# Patient Record
Sex: MALE | Race: WHITE | Employment: FULL TIME | URBAN - METROPOLITAN AREA
[De-identification: names, ages, dates, MRNs, and addresses within clinical notes are randomized per-mention and may not be internally consistent; named-entity substitution may affect disease eponyms.]

---

## 2017-07-07 ENCOUNTER — HOSPITAL ENCOUNTER (OUTPATIENT)
Dept: PHYSICAL THERAPY | Age: 57
Discharge: HOME | End: 2017-07-07
Payer: OTHER MISCELLANEOUS

## 2017-07-07 PROCEDURE — 97140 MANUAL THERAPY 1/> REGIONS: CPT

## 2017-07-07 PROCEDURE — 97165 OT EVAL LOW COMPLEX 30 MIN: CPT

## 2017-07-07 NOTE — PROGRESS NOTES
Hand Therapy Evaluation and Daily Note    Patient Name: Milton Perea  Date:2017  : 1960  Age: 61 y.o.y/o  [x]  Patient  Verified  Payor: [facility] / Plan: [id number] [facility] / Product Type: Workers Comp /    Referring Provider: MD Mariana Bradshaw MD Visit:  17  Onset Date:  17    In time:1310  Out time:1410  Total Treatment Time (min): 15 min  Visit #: 1 of 8    Treatment Area: Right hand pain [M79.641]    Precautions: NA    Hand Dominance: right handed   Hand Involved: right    Total Evaluation Time:  45 min    History of Present Condition:  Patient is a 61 y.o. male with a chief complaint of pain and limited functional ROM of the right hand/digits for the last month secondary to falling on his hand after he tripped over a piece of luggage at work on 17. He has a history of trigger finger in the right ring finger. Pain appears to be increased at the MF at the radial and dorsal side of the MP. ? Collateral ligament or sagittal band injury. Pain Rating:   Current: (0-no pain 10-debilitating pain) 1 / 10   At best: (0-no pain 10-debilitating pain) 1 / 10  At worst: (0-no pain 10-debilitating pain) 3 / 10  Location: right hand  Type:  mild   Better with: compression garment  Worse with: gripping and extension of the digits at the MP joints. Medications/Allergies/Past Medical History:  See chart; reviewed with patient. Arthritis, HTN, sleep apnea    Diagnostic Tests: x-ray- no fractures noted    Prior Level of Function: Independent without limitations in ADL and IADL activities.     Current Level of Function:  Limited functional use of the right hand secondary to injury    Social History:     Occupation/Job Requirements: - walking, moving, handling luggage and repetitive work, pushing, pulling    Observation: Presents with compression glove on the right hand, noted limited AROM of the digits for extension. Palpation:  Pain at the radial side of the R middle finger MP and on the top of the joint. Range of Motion:   Hand ROM   2nd 3rd 4th 5th   MP WNL 5/80 75 70   PIP  25/90 25/90 15/95   DIP  20/60 5/40 55   SWAN  170 170 205       Strength:   Measurements: Taken with Carrillo Dynamometer, in Lbs   Level 2 7/7/17 Date Date Date Date Date Date   Right 41         Left 107         Deficit 62%         Change                Pinch Measurements: Taken with Pinch Gauge, in Lbs   (hand) 7/7/17 Date Date Date Date Date   Lateral          Right 15        Left  22        Deficit         Change         Pad         Right 12        Left 12        Deficit         Change         Horacio         Right 8        Left 14        Deficit         Change           Sensation:    No problems reported    Edema: GIRTH CHART measured in cm  Date: 7/7/17    Side right left   DPC circum. 24 23.5   2nd- P1 7.5 7   3rd-P1 7.6 7              Special Tests:    Provocative test: Positive pain with lateral stress to the right MF ulnarly.     ADLs  Feeding:        []MaxA   []ModA   []Cherise   [] CGA   []SBA   []Isai   [x]Independent  UE Dressing:       []MaxA   []ModA   []Cherise   [] CGA   []SBA   []Isai   [x]Independent  LE Dressing:       []MaxA   []ModA   []Cherise   [] CGA   []SBA   []Isai   [x]Independent  Grooming:       []MaxA   []ModA   []Cherise   [] CGA   []SBA   []Isai   [x]Independent  Toileting:       []MaxA   []ModA   []Cherise   [] CGA   []SBA   []Isai   [x]Independent  Bathing:       []MaxA   []ModA   []Cherise   [] CGA   []SBA   []Isai   [x]Independent  Light Meal Prep:    []MaxA   []ModA   []Cherise   [] CGA   []SBA   []Isai   [x]Independent  Household/Other: []MaxA   []ModA   []Cherise   [] CGA   []SBA   []Isai   [x]Independent  Adaptive Equip:     []MaxA   []ModA   []Cherise   [] CGA   []SBA   []Isai   []Independent  Driving:       []MaxA   []ModA   []Cherise   [] CGA   []SBA   []Isai   [x]Independent      Todays Treatment:  Patient received an initial evaluation today followed by education as to diagnosis, precautions and treatment plan. Patient was provided with a basic home exercise program including instruction in jacky strap donning/doffing, wear and care, activity modifications and tendon gliding. OBJECTIVE    5 min Therapeutic Exercise:  [x] See flow sheet : ROM   Rationale: increase ROM to improve the patients ability to reduce edema    5 min Manual Therapy:  IASTM with Graston tool # 3, 6, using sweeping and fanning techniques   Rationale: decrease pain, increase ROM, increase tissue extensibility and decrease edema  to  Right hand/MF    5 min Orthotic/Splinting: fitted with jacky strap MF to IF   Rationale: lateral stress protection  to improve the patients ability to heal soft tissue injury while maintaining functional use. With   [] TE   [] TA   [] neuro   [] other: Patient Education: [x] Review HEP    [] Progressed/Changed HEP based on:   [] positioning   [] body mechanics   [] transfers   [] heat/ice application   [] Splint wear/care   [] Sensory re-education   [] scar management      [] other:      Pain Level (0-10 scale) post treatment: 1/10    Patient will continue to benefit from skilled OT services to address ROM deficits, address strength deficits and analyze and address soft tissue restrictions to attain goals. Assessment:  Patient is a 61 y.o. male with a chief complaint of pain and limited functional ROM of the right hand/digits for the last month secondary to falling on his hand after he tripped over a piece of luggage at work on 6/14/17. He has a history of trigger finger in the right ring finger. Pain appears to be increased at the MF at the radial and dorsal side of the MP. ? Collateral ligament or sagittal band injury. x-ray- no fractures noted  Presents with compression glove on the right hand, noted limited AROM of the digits for extension.   Patient received an initial evaluation today followed by education as to diagnosis, precautions and treatment plan. Patient was provided with a basic home exercise program including instruction in jacky strap donning/doffing, wear and care, activity modifications and tendon gliding.       Evaluation Complexity: History LOW Complexity : Brief history review  Examination LOW Complexity : 1-3 performance deficits relating to physical, cognitive , or psychosocial skils that result in activity limitations and / or participation restrictions  Clinical Decision Making LOW Complexity : No comorbidities that affect functional and no verbal or physical assistance needed to complete eval tasks   Overall Complexity Rating: LOW      Problem List: Pain effecting function, Decreased range of motion, Decreased strength, Edema effecting function and Decreased coordination/prehension                         Treatment Plan may include any combination of the following: Therapeutic exercise, Physical agent/modality, Manual therapy, Splinting/orthoses and Patient education     Patient / Family readiness to learn indicated by: asking questions, trying to perform skills and interest     Persons(s) to be included in education:   patient (P)     Barriers to Learning/Limitations: None     Patient Goal (s): reduce pain and return to full use of the hand     Patient Self Reported Health Status: excellent     Rehabilitation Potential: excellent       Short Term Goals: To be accomplished in 2  weeks:  Goal:* Patient will be compliant with initial home exercise program to take an active role in their rehabilitation process. Status at Eval:    Goal:* Patient will demonstrate a good understanding of their condition and strategies for self-management. Status at Eval:    Long Term Goals: To be accomplished in 4 weeks:                       Goal:*Patient will regain 220 degrees total arc of motion of the right digits 2-5 to enable grasp of cylindrical objects such as a glass, handle or toothbrush.   At Eval:  See chart    Goal:*Patient will attain 10 degrees or less of right 2-5 digital extension to enable him/ her to reach into pocket. At Eval: see eval    Goal:* Patient will show a 5 point improvement on FOTO functional status measure to improve overall functional performance. Status at Eval: 69    Goal:* Pt will have 100 pounds of  in the right hand to allow for functional grasp for all ADL activities including dressing, bathing and self care.   At Eval: 41       Frequency / Duration: Patient to be seen 2 times per week for 4 weeks:     Patient/ Caregiver education and instruction: Diagnosis, prognosis, self care, activity modification, brace/ splint application and exercises  [x]  Plan of care has been reviewed with Michael Cardozo OT 7/7/2017 2:30 PM

## 2017-07-09 NOTE — PROGRESS NOTES
[facility]  [address]  [place], [address]  [phone] fax    Plan of Care/Statement of Necessity for Occupational Therapy Services    Patient name: Anival Krishna Start of Care: 2017   Referral source: Jeryr Garay MD : 1960    Medical Diagnosis: Right hand pain [M79.641]   Onset Date: 17   Treatment Diagnosis: right hand pain   Prior Hospitalization: see medical history Provider#: [id number]   Medications: Verified on Patient summary List    Comorbidities: HTN, sleep apnea   Prior Level of Function: Independent without limitations in ADL and IADL activities. The Plan of Care and following information is based on the information from the initial evaluation. Assessment/ key information: Patient is a 61 y.o. male with a chief complaint of pain and limited functional ROM of the right hand/digits for the last month secondary to falling on his hand after he tripped over a piece of luggage at work on 17. He has a history of trigger finger in the right ring finger. Pain appears to be increased at the MF at the radial and dorsal side of the MP. ? Collateral ligament or sagittal band injury. x-ray- no fractures noted  Presents with compression glove on the right hand, noted limited AROM of the digits for extension. Patient received an initial evaluation today followed by education as to diagnosis, precautions and treatment plan.   Patient was provided with a basic home exercise program including instruction in jacky strap donning/doffing, wear and care, activity modifications and tendon gliding.       Evaluation Complexity: History LOW Complexity : Brief history review  Examination LOW Complexity : 1-3 performance deficits relating to physical, cognitive , or psychosocial skils that result in activity limitations and / or participation restrictions  Clinical Decision Making LOW Complexity : No comorbidities that affect functional and no verbal or physical assistance needed to complete eval tasks   Overall Complexity Rating: LOW       Problem List: Pain effecting function, Decreased range of motion, Decreased strength, Edema effecting function and Decreased coordination/prehension                          Treatment Plan may include any combination of the following: Therapeutic exercise, Physical agent/modality, Manual therapy, Splinting/orthoses and Patient education      Patient / Family readiness to learn indicated by: asking questions, trying to perform skills and interest      Persons(s) to be included in education:   patient (P)      Barriers to Learning/Limitations: None      Patient Goal (s): reduce pain and return to full use of the hand      Patient Self Reported Health Status: excellent      Rehabilitation Potential: excellent         Short Term Goals: To be accomplished in 2  weeks:  Goal:* Patient will be compliant with initial home exercise program to take an active role in their rehabilitation process. Status at Eval:     Goal:* Patient will demonstrate a good understanding of their condition and strategies for self-management. Status at Eval:     Long Term Goals: To be accomplished in 4 weeks:                       Goal:*Patient will regain 220 degrees total arc of motion of the right digits 2-5 to enable grasp of cylindrical objects such as a glass, handle or toothbrush. At Eval:  See chart     Goal:*Patient will attain 10 degrees or less of right 2-5 digital extension to enable him/ her to reach into pocket. At Eval: see eval     Goal:* Patient will show a 5 point improvement on FOTO functional status measure to improve overall functional performance. Status at Eval: 69     Goal:* Pt will have 100 pounds of  in the right hand to allow for functional grasp for all ADL activities including dressing, bathing and self care.   At Eval: 39         Frequency / Duration: Patient to be seen 2 times per week for 4 weeks:      Patient/ Caregiver education and instruction: Diagnosis, prognosis, self care, activity modification, brace/ splint application and exercises  [x]  Plan of care has been reviewed with CHERISE Yan OT 7/7/2017 2:30 PM________________________________________________________________________    I certify that the above Therapy Services are being furnished while the patient is under my care. I agree with the treatment plan and certify that this therapy is necessary.     Physician's Signature:____________________  Date:____________Time:__________    Please sign and return to [facility] [address]  [address]  [place], [address]  [phone] fax

## 2017-07-12 ENCOUNTER — HOSPITAL ENCOUNTER (OUTPATIENT)
Dept: PHYSICAL THERAPY | Age: 57
Discharge: HOME | End: 2017-07-12
Payer: OTHER MISCELLANEOUS

## 2017-07-12 PROCEDURE — 97022 WHIRLPOOL THERAPY: CPT

## 2017-07-12 PROCEDURE — 97035 APP MDLTY 1+ULTRASOUND EA 15: CPT

## 2017-07-12 PROCEDURE — 97140 MANUAL THERAPY 1/> REGIONS: CPT

## 2017-07-12 NOTE — PROGRESS NOTES
OT DAILY TREATMENT NOTE  3-16    Patient Name: Bandar Fuentes  Date:2017  : 1960  [x]  Patient  Verified  Payor: Johnyn Randolph / Plan: [id number] / Product Type: Workers Comp /    In [employer] time:320  Total Treatment Time (min): 50  Visit #: 2 of 8    Treatment Area: Right hand pain [M79.641]    SUBJECTIVE  Pain Level (0-10 scale): 0/10  Any medication changes, allergies to medications, adverse drug reactions, diagnosis change, or new procedure performed?: [x] No    [] Yes (see summary sheet for update)  Subjective functional status/changes:   [] No changes reported  I haven't really done my exercises. I have been busy and I just forget. There is no pain if I am not doing anything, if I move it, it is sore and stiff.     OBJECTIVE    Modality rationale: decrease pain and increase tissue extensibility to improve the patients ability to use R hand   Min Type Additional Details    [] Estim:  []Unatt       []IFC  []Premod                        []Other:  []w/ice   []w/heat  Position:  Location:    [] Estim: []Att    []TENS instruct  []NMES                    []Other:  []w/US   []w/ice   []w/heat  Position:  Location:    []  Traction: [] Cervical       []Lumbar                       [] Prone          []Supine                       []Intermittent   []Continuous Lbs:  [] before manual  [] after manual   10 [x]  Ultrasound: []Continuous   [x] Pulsed                           []1MHz   [x]3MHz W/cm2: 1.0  Location: R Palmar, MF, RF    []  Iontophoresis with dexamethasone         Location: [] Take home patch   [] In clinic   20 []  Ice     [x]  Heat : fluido  []  Ice massage  []  Laser   []  Anodyne Position: AROM  Location: R Hand    []  Laser with stim  []  Other:  Position:  Location:    []  Vasopneumatic Device Pressure:       [] lo [] med [] hi   Temperature: [] lo [] med [] hi   [x] Skin assessment post-treatment:  [x]intact []redness- no adverse reaction []redness  adverse reaction:         10 min Manual Therapy:  IASTM   Rationale: increase ROM, increase tissue extensibility and decrease trigger points     IASTM of R Palm by ANTHONY         With   [] TE   [] TA   [] neuro   [x] other: Patient Education: [x] Review HEP    [] Progressed/Changed HEP based on:   [] positioning   [] body mechanics   [] transfers   [] heat/ice application   [] Splint wear/care   [] Sensory re-education   [] scar management      [x] other:  Educated on importance of performing HEP, Demonstration of Tendon Glides            Other Objective/Functional Measures: Increased nodules on radial side of RF and Ulnar side of MF. Pain Level (0-10 scale) post treatment: 0/10    ASSESSMENT/Changes in Function: Pt is not performing HEP consistently, No reports of recent triggering, Stiffness still impairing full function of R Hand. Patient will continue to benefit from skilled OT services to modify and progress therapeutic interventions, address ROM deficits, address strength deficits and analyze and address soft tissue restrictions to attain remaining goals. []  See Plan of Care  []  See progress note/recertification  []  See Discharge Summary         Progress towards goals / Updated goals:  Short Term Goals: To be accomplished in 2  weeks:  Goal:* Patient will be compliant with initial home exercise program to take an active role in their rehabilitation process. Status at [facility]:  Not Met, Pt not performing HEP consistently 7/12/17      Goal:* Patient will demonstrate a good understanding of their condition and strategies for self-management. Status at [facility]:  [address] [place] [address] 280 be accomplished in 4 weeks:                       Goal:*Patient will regain 220 degrees total arc of motion of the right digits 2-5 to enable grasp of cylindrical objects such as a glass, handle or toothbrush.   At [facility]:  See chart      Goal:*Patient will attain 10 degrees or less of right 2-5 digital extension to enable him/ her to reach into pocket. At Eval: see eval      Goal:* Patient will show a 5 point improvement on FOTO functional status measure to improve overall functional performance. Status at Eval: 71      Goal:* Pt will have 100 pounds of  in the right hand to allow for functional grasp for all ADL activities including dressing, bathing and self care.   At Eval: 41    PLAN  []  Upgrade activities as tolerated     [x]  Continue plan of care  []  Update interventions per flow sheet       []  Discharge due to:_  []  Other:_      ANTHONY Fu 7/12/2017  3:56 PM    Future Appointments  Date Time Provider Emelia Lima   7/19/2017 3:00 PM [facility] PT [facility]   7/20/2017 3:00 PM Mancuso Radon, OT [facility]   7/25/2017 2:30 PM Mancuso Radon, OT [facility]   7/27/2017 2:30 PM Mancuso Radon, OT [facility]   8/1/2017 2:30 PM Mancuso Radon, OT [facility]   8/3/2017 2:30 PM Mancuso Radon, OT [facility]

## 2017-07-19 ENCOUNTER — APPOINTMENT (OUTPATIENT)
Dept: PHYSICAL THERAPY | Age: 57
End: 2017-07-19
Payer: OTHER MISCELLANEOUS

## 2017-07-20 ENCOUNTER — APPOINTMENT (OUTPATIENT)
Dept: PHYSICAL THERAPY | Age: 57
End: 2017-07-20
Payer: OTHER MISCELLANEOUS

## 2017-07-25 ENCOUNTER — HOSPITAL ENCOUNTER (OUTPATIENT)
Dept: PHYSICAL THERAPY | Age: 57
Discharge: HOME | End: 2017-07-25
Payer: OTHER MISCELLANEOUS

## 2017-07-25 PROCEDURE — 97110 THERAPEUTIC EXERCISES: CPT

## 2017-07-25 NOTE — PROGRESS NOTES
OT DAILY TREATMENT NOTE  3-16    Patient Name: Clark Barajas  Date:2017  : 1960  [x]  Patient  Verified  Payor: [employer] / Plan: [id number] / Product Type:  Workers Comp /    In time: 1430  Out time:  1530  Total Treatment Time (min): 39  Visit #: 3 of 8    Treatment Area: Right hand pain [M79.641]    SUBJECTIVE  Pain Level (0-10 scale): 0-1/10  Any medication changes, allergies to medications, adverse drug reactions, diagnosis change, or new procedure performed?: [x] No    [] Yes (see summary sheet for update)  Subjective functional status/changes:   [] No changes reported  \"Its still hurting but more when I use it than resting    OBJECTIVE    Modality rationale: increase tissue extensibility to improve the patients ability to move the digits without pain   Min Type Additional Details    [] Estim:  []Unatt       []IFC  []Premod                        []Other:  []w/ice   []w/heat  Position:  Location:    [] Estim: []Att    []TENS instruct  []NMES                    []Other:  []w/US   []w/ice   []w/heat  Position:  Location:    []  Traction: [] Cervical       []Lumbar                       [] Prone          []Supine                       []Intermittent   []Continuous Lbs:  [] before manual  [] after manual    []  Ultrasound: []Continuous   [] Pulsed                           []1MHz   []3MHz W/cm2:  Location:    []  Iontophoresis with dexamethasone         Location: [] Take home patch   [] In clinic   15 []  Ice     [x]  heat  []  Ice massage  []  Laser   []  Anodyne Position:resting  Location: right hand    []  Laser with stim  []  Other:  Position:  Location:    []  Vasopneumatic Device Pressure:       [] lo [] med [] hi   Temperature: [] lo [] med [] hi   [x] Skin assessment post-treatment:  [x]intact []redness- no adverse reaction    []redness  adverse reaction:     30 min Therapeutic Exercise:  [x] See flow sheet :   Rationale: increase ROM and increase strength to improve the patients ability to return to full functional use of the hand. With   [] TE   [] TA   [] neuro   [] other: Patient Education: [x] Review HEP    [] Progressed/Changed HEP based on:   [] positioning   [] body mechanics   [] transfers   [] heat/ice application   [] Splint wear/care   [] Sensory re-education   [] scar management      [] other:             Other Objective/Functional Measures:   Range of Motion:   Hand ROM -7/7/17    2nd 3rd 4th 5th   MP WNL 5/80 75 70   PIP   25/90 25/90 15/95   DIP   20/60 5/40 55   SAWN   170 170 205       Hand ROM   2nd 3rd 4th 5th   MP  5/85 80 80   PIP  5/90 5/95 5/95   DIP  0/65 0/50 0/55   SWAN  230 220 225       Strength:   Measurements: Taken with Carrillo Dynamometer, in Lbs   Level 2 7/7/17 7/25/17 Date Date Date Date Date   Right 39  60             Left 107               Deficit 62%               Change    +19                  Pinch Measurements: Taken with Pinch Gauge, in Lbs   (hand) 7/7/17 7/25/17 Date Date Date Date   Lateral                Right 15  19           Left  22             Deficit               Change    +4           Pad               Right 12  14           Left 12             Deficit               Change   +2            Horacio               Right 8 15            Left 14             Deficit               Change   +7               Sensation:    No problems reported     Edema: GIRTH CHART measured in cm  Date: 7/7/17     Side right left   DPC circum. 24 23.5   2nd- P1 7.5 7   3rd-P1 7.6 7                Pain Level (0-10 scale) post treatment: 0-1/10    ASSESSMENT/Changes in Function: Decreased compliance with jacky strap, patient does report intermittent jacky taping,  Review reasoning behind need for use. Significant improvement  In  and functional use.     Patient will continue to benefit from skilled OT services to address ROM deficits, address strength deficits and analyze and address soft tissue restrictions to attain remaining goals. [x]  See Plan of Care  []  See progress note/recertification  []  See Discharge Summary         Progress towards goals / Updated goals:  Short Term Goals: To be accomplished in 2  weeks:  Goal:* Patient will be compliant with initial home exercise program to take an active role in their rehabilitation process. Status at Eval:   Patient was provided with a basic home exercise program including instruction in jacky strap donning/doffing, wear and care, activity modifications and tendon gliding.   Not Met, Pt not performing HEP consistently 7/12/17      Goal:* Patient will demonstrate a good understanding of their condition and strategies for self-management. Status at Eval:  Patient received an initial evaluation today followed by education as to diagnosis, precautions and treatment plan. Status as last note/recert: patient reports understanding of recovery process but is not 100% compliant with positioning and activity restrictions secondary to work.  [address] be accomplished in 4 weeks:                       Goal:*Patient will regain 220 degrees total arc of motion of the right digits 2-5 to enable grasp of cylindrical objects such as a glass, handle or toothbrush. At Eval:  See chart  Status as last note/recert:  Patient has met for all all digits.        Goal:*Patient will attain 10 degrees or less of right 2-5 digital extension to enable him/ her to reach into pocket. At Eval: see eval  Status as last note/recert: Patient has met goal for all digits.        Goal:* Patient will show a 5 point improvement on FOTO functional status measure to improve overall functional performance. Status at Eval: 71       Goal:* Pt will have 100 pounds of  in the right hand to allow for functional grasp for all ADL activities including dressing, bathing and self care.   At Eval: 41  Status as last note/recert: progressing 60      PLAN  []  Upgrade activities as tolerated     [x] Continue plan of care  []  Update interventions per flow sheet       []  Discharge due to:_  []  Other:_      Roger Shelter, OT 7/25/2017  2:35 PM    Future Appointments  Date Time Provider Emelia Lima   7/27/2017 2:30 PM [facility]   8/1/2017 2:30 PM Roger Shelter, OT [facility]   8/3/2017 2:30 PM Roger Shelter, OT [facility]

## 2017-07-27 ENCOUNTER — HOSPITAL ENCOUNTER (OUTPATIENT)
Dept: PHYSICAL THERAPY | Age: 57
Discharge: HOME | End: 2017-07-27
Payer: OTHER MISCELLANEOUS

## 2017-07-27 PROCEDURE — 97110 THERAPEUTIC EXERCISES: CPT

## 2017-07-27 NOTE — PROGRESS NOTES
OT DAILY TREATMENT NOTE  3-16    Patient Name: Ladonna Skiff  Date:2017  : 1960  [x]  Patient  Verified  Payor: Queenie Emmanuelle / Plan: [id number] [facility] / Product Type: Workers Comp /    In [employer]  Out time:1505  Total Treatment Time (min): 30  Visit #: 4 of 8    Treatment Area: Right hand pain [M79.641]    SUBJECTIVE  Pain Level (0-10 scale): 0/10  Any medication changes, allergies to medications, adverse drug reactions, diagnosis change, or new procedure performed?: [x] No    [] Yes (see summary sheet for update)  Subjective functional status/changes:   [] No changes reported  \"My pinky is what is bothering me now when I am gripping. OBJECTIVE    20 min Therapeutic Exercise:  [x] See flow sheet :   Rationale: increase ROM and increase strength to improve the patients ability to return to full functional use of the right hand without pain. 10 min Manual Therapy: IASTM with Graston tool # 3.6, using sweeping, fanning, framing techniques   Rationale: decrease pain, increase tissue extensibility and decrease edema  to the digits.       With   [] TE   [] TA   [] neuro   [] other: Patient Education: [x] Review HEP    [] Progressed/Changed HEP based on:   [] positioning   [] body mechanics   [] transfers   [] heat/ice application   [] Splint wear/care   [] Sensory re-education   [] scar management      [] other:             Other Objective/Functional Measures: Range of Motion:   Hand ROM -17     2nd 3rd 4th 5th   MP WNL 5/80 75 70   PIP    25/90 25/ 15/95   DIP    20/60 5/40 55   SWAN    170 170 205        Hand ROM    2nd 3rd 4th 5th   MP   5/85 80 80   PIP   5/90 5/95 5/95   DIP   0/65 0/50 0/55   SWAN   230 220 225         Strength:   Measurements: Taken with Carrillo Dynamometer, in Lbs   Level 2 17 Date Date Date Date Date   Right 39  60                  Left 107                     Deficit 62%                     Change     +19                     Pinch Measurements: Taken with Pinch Gauge, in Lbs   (hand) 7/7/17 7/25/17 Date Date Date Date   Lateral                      Right 15  19              Left  22                  Deficit                     Change     +4               Pad                     Right 12  14               Left 12                  Deficit                     Change    +2                Horacio                     Right 8 15                Left 14                  Deficit          [de-identified]      Change    +7                    Sensation:    No problems reported      Edema: GIRTH CHART measured in cm  Date: 7/7/17      Side right left   DPC circum. 24 23.5   2nd- P1 7.5 7   3rd-P1 7.6 7                     Pain Level (0-10 scale) post treatment: 0-1/10     ASSESSMENT/Changes in Function:   Pain in the SF at MP with lateral stress, provided with a jacky strap for wear with activities.       Patient will continue to benefit from skilled OT services to address ROM deficits, address strength deficits and analyze and address soft tissue restrictions to attain remaining goals.      [x]  See Plan of Care  []  See progress note/recertification  []  See Discharge Summary      Progress towards goals / Updated goals:  Short Term Goals: To be accomplished in 2  weeks:  Goal:* Patient will be compliant with initial home exercise program to take an active role in their rehabilitation process. Status at [facility]:   Patient was provided with a basic home exercise program including instruction in jacky strap donning/doffing, wear and care, activity modifications and tendon gliding.   Not Met, Pt not performing HEP consistently 7/12/17      Goal:* Patient will demonstrate a good understanding of their condition and strategies for self-management. Status at Eval:  Patient received an initial evaluation today followed by education as to diagnosis, precautions and treatment plan.   Status as last note/recert: patient reports understanding of recovery process but is not 100% compliant with positioning and activity restrictions secondary to work.  [address] be accomplished in 4 weeks:                       Goal:*Patient will regain 220 degrees total arc of motion of the right digits 2-5 to enable grasp of cylindrical objects such as a glass, handle or toothbrush. At Eval:  See chart  Status as last note/recert:  Patient has met for all all digits.         Goal:*Patient will attain 10 degrees or less of right 2-5 digital extension to enable him/ her to reach into pocket. At Eval: see eval  Status as last note/recert: Patient has met goal for all digits.         Goal:* Patient will show a 5 point improvement on FOTO functional status measure to improve overall functional performance. Status at Eval: 71       Goal:* Pt will have 100 pounds of  in the right hand to allow for functional grasp for all ADL activities including dressing, bathing and self care.   At Eval: 41  Status as last note/recert: progressing 60    PLAN  []  Upgrade activities as tolerated     [x]  Continue plan of care  []  Update interventions per flow sheet       []  Discharge due to:_  []  Other:_      Zoraida Cedeno OT 7/27/2017  7:00 PM    Future Appointments  Date Time Provider Emelia Lima   8/3/2017 2:30 PM Shazia Benjamin OT [facility]   8/15/2017 2:30 PM Zoraida Cedeno OT [facility]   8/17/2017 2:30 [facility]   8/22/2017 3:00 PM Zoraiad Cedeno OT [facility]

## 2017-08-01 ENCOUNTER — APPOINTMENT (OUTPATIENT)
Dept: PHYSICAL THERAPY | Age: 57
End: 2017-08-01
Payer: OTHER MISCELLANEOUS

## 2017-08-03 ENCOUNTER — HOSPITAL ENCOUNTER (OUTPATIENT)
Dept: PHYSICAL THERAPY | Age: 57
Discharge: HOME | End: 2017-08-03
Payer: OTHER MISCELLANEOUS

## 2017-08-03 PROCEDURE — 97110 THERAPEUTIC EXERCISES: CPT

## 2017-08-03 NOTE — PROGRESS NOTES
OT DAILY TREATMENT NOTE  3-16    Patient Name: Velma More  Date:8/3/2017  : 1960  [x]  Patient  Verified  Payor: Luisa Gutierrezrosalina / Plan: [id number] / Product Type: Workers Comp /    In [address] [place] [address] time:1535  Total Treatment Time (min): 55  Visit #: 5 of 8    Treatment Area: Right hand pain [M79.641]    SUBJECTIVE  Pain Level (0-10 scale): 0/10  Any medication changes, allergies to medications, adverse drug reactions, diagnosis change, or new procedure performed?: [x] No    [] Yes (see summary sheet for update)  Subjective functional status/changes:   [] No changes reported  \"I lost the straps for my fingers. \"    OBJECTIVE  Modality rationale: increase tissue extensibility to improve the patients ability to improve ROM   Min Type Additional Details    [] Estim:  []Unatt       []IFC  []Premod                        []Other:  []w/ice   []w/heat  Position:  Location:    [] Estim: []Att    []TENS instruct  []NMES                    []Other:  []w/US   []w/ice   []w/heat  Position:  Location:    []  Traction: [] Cervical       []Lumbar                       [] Prone          []Supine                       []Intermittent   []Continuous Lbs:  [] before manual  [] after manual    []  Ultrasound: []Continuous   [] Pulsed                           []1MHz   []3MHz Location:  W/cm2:    []  Iontophoresis with dexamethasone         Location: [] Take home patch   [] In clinic   15 []  Ice     [x]  heat  []  Ice massage  []  Laser   []  Anodyne Position:resting  Location:  Right hand    []  Laser with stim  []  Other: Position:  Location:    []  Vasopneumatic Device Pressure:       [] lo [] med [] hi   Temperature: [] lo [] med [] hi   [x] Skin assessment post-treatment:  []intact []redness- no adverse reaction    []redness  adverse reaction:     40 min Therapeutic Exercise:  [x] See flow sheet :   Rationale: increase ROM and increase strength to improve the patients ability to return to full use of the right UE    With   [] TE   [] TA   [] neuro   [] other: Patient Education: [x] Review HEP    [] Progressed/Changed HEP based on:   [] positioning   [] body mechanics   [] transfers   [] heat/ice application   [] Splint wear/care   [] Sensory re-education   [] scar management      [] other:             Other Objective/Functional Measures:       Range of Motion:   Hand ROM -7/7/17 2nd 3rd 4th 5th   MP WNL 5/80 75 70   PIP    25/90 25/90 15/95   DIP    20/60 5/40 55   SWAN    170 170 205        Hand ROM- 7/25/17 2nd 3rd 4th 5th   MP   5/85 80 80   PIP   5/90 5/95 5/95   DIP   0/65 0/50 0/55   SWAN   230 220 225       Hand ROM- 8/3/17   2nd 3rd 4th 5th   MP  5/80 80 75   PIP  5/90 5/90 5/95   DIP  60 45 55   SWAN  220 210 220       Strength:   Measurements: Taken with Carrillo Dynamometer, in Lbs   Level 2 7/7/17 7/25/17 8/3/17 Date Date Date Date   Right 39  60  84                Left 107                     Deficit 62%                     Change     +19 +24         Aretta Flick           Pinch Measurements: Taken with Pinch Gauge, in Lbs   (hand) 7/7/17 7/25/17 8/3/17 Date Date Date   Lateral                      Right 15  19 19             Left  22                  Deficit                     Change     +4  0            Pad                     Right 12  14 14              Left 12                  Deficit                     Change    +2   0             Horacio                     Right 8 15    15            Left 14                  Deficit                     Change    +7    0                Sensation:    No problems reported      Edema: GIRTH CHART measured in cm  Date: 7/7/17    8/3/17   Side right left    DPC circum.  24 23.5 24.5   2nd- P1 7.5 7    3rd-P1 7.6 7                   ASSESSMENT/Changes in Function: Increased swelling and discomfort secondary to increase use.     Patient will continue to benefit from skilled OT services to address ROM deficits, address strength deficits and analyze and address soft tissue restrictions to attain remaining goals.      [x]  See Plan of Care  []  See progress note/recertification  []  See Discharge Summary      Progress towards goals / Updated goals:  Short Term Goals: To be accomplished in 2  weeks:  Goal:* Patient will be compliant with initial home exercise program to take an active role in their rehabilitation process. Status at Eval:   Patient was provided with a basic home exercise program including instruction in jacky strap donning/doffing, wear and care, activity modifications and tendon gliding.   Not Met, Pt not performing HEP consistently 7/12/17      Goal:* Patient will demonstrate a good understanding of their condition and strategies for self-management. Status at Eval:  Patient received an initial evaluation today followed by education as to diagnosis, precautions and treatment plan. Status as last note/recert: patient reports understanding of recovery process but is not 100% compliant with positioning and activity restrictions secondary to work.  [address] be accomplished in 4 weeks:                       Goal:*Patient will regain 220 degrees total arc of motion of the right digits 2-5 to enable grasp of cylindrical objects such as a glass, handle or toothbrush. At Eval:  See chart  Status as last note/recert:  Patient has met for all all digits.         Goal:*Patient will attain 10 degrees or less of right 2-5 digital extension to enable him/ her to reach into pocket. At Eval: see eval  Status as last note/recert: Patient has met goal for all digits.         Goal:* Patient will show a 5 point improvement on FOTO functional status measure to improve overall functional performance. Status at Eval: 71       Goal:* Pt will have 100 pounds of  in the right hand to allow for functional grasp for all ADL activities including dressing, bathing and self care.   At Eval: 41  Status as last note/recert: progressing 43     PLAN  [] Upgrade activities as tolerated     [x]  Continue plan of care  []  Update interventions per flow sheet       []  Discharge due to:_  []  Other:_      David Melton OT 8/3/2017  2:55 PM    Future Appointments  Date Time Provider Emelia Lima   8/15/2017 2:30 PM [facility]   8/17/2017 2:30 PM David Melton OT [facility]   8/22/2017 3:00 PM [facility]

## 2017-08-15 ENCOUNTER — HOSPITAL ENCOUNTER (OUTPATIENT)
Dept: PHYSICAL THERAPY | Age: 57
Discharge: HOME | End: 2017-08-15
Payer: OTHER MISCELLANEOUS

## 2017-08-15 PROCEDURE — 97110 THERAPEUTIC EXERCISES: CPT

## 2017-08-15 NOTE — PROGRESS NOTES
OT DAILY TREATMENT NOTE  3-16    Patient Name: Brittany Roberson  Date:8/15/2017  : 1960  [x]  Patient  Verified  Payor: Grafton Carlisle / Plan: [id number] / Product Type: Workers Comp /    In [employer] time:1500  Total Treatment Time (min): 30  Visit #: 6 of 8    Treatment Area: Right hand pain [M79.641]    SUBJECTIVE  Pain Level (0-10 scale): 0/10  Any medication changes, allergies to medications, adverse drug reactions, diagnosis change, or new procedure performed?: [x] No    [] Yes (see summary sheet for update)  Subjective functional status/changes:   [] No changes reported  \"My ring finger seems to be the worst, because of the triggering. \"    OBJECTIVE    30 min Therapeutic Exercise:  [x] See flow sheet :   Rationale: increase strength to improve the patients ability to return to full functional use of the right hand    With   [] TE   [] TA   [] neuro   [] other: Patient Education: [x] Review HEP    [] Progressed/Changed HEP based on:   [] positioning   [] body mechanics   [] transfers   [] heat/ice application   [] Splint wear/care   [] Sensory re-education   [] scar management      [] other:             Other Objective/Functional Measures: Range of Motion:   Hand ROM -17     2nd 3rd 4th 5th   MP WNL 5/80 75 70   PIP    25/90 25/90 15/95   DIP    20/60 5/40 55   SWAN    170 170 205        Hand ROM- 17 3rd 4th 5th   MP    5/85 80 80   PIP    5/90 5/95 5/95   DIP    0/65 0/50 0/55   SWAN    230 220 225        Hand ROM- 8/3/17    2nd 3rd 4th 5th   MP   5/80 80 75   PIP   5/90 5/90 5/95   DIP   60 45 55   SWAN   220 210 220         Strength:   Measurements: Taken with Carrillo Dynamometer, in Lbs   Level 2 7/7/17 7/25/17 8/3/17 Date Date Date Date   Right 41  60  84                Left 107                     Deficit 62%                     Change     +19 +24                      Pinch Measurements: Taken with Pinch Gauge, in Lbs   (hand) 17 7/25/17 8/3/17 Date Date Date   Lateral                      Right 15  19 19             Left  22                  Deficit                     Change     +4  0            Pad                     Right 12  14 14              Left 12                  Deficit                     Change    +2   0             Horacio                     Right 8 15    15            Left 14                  Deficit          [de-identified]      Change    +7    0                Sensation:    No problems reported      Edema: GIRTH CHART measured in cm  Date: 7/7/17    8/3/17   Side right left     DPC circum. 24 23.5 24.5   2nd- P1 7.5 7     3rd-P1 7.6 7                      ASSESSMENT/Changes in Function: swelling reduced per report, decrease pain. Patient will continue to benefit from skilled OT services to address ROM deficits, address strength deficits and analyze and address soft tissue restrictions to attain remaining goals.      [x]  See Plan of Care  []  See progress note/recertification  []  See Discharge Summary      Progress towards goals / Updated goals:  Short Term Goals: To be accomplished in 2  weeks:  Goal:* Patient will be compliant with initial home exercise program to take an active role in their rehabilitation process. Status at Eval:   Patient was provided with a basic home exercise program including instruction in jacky strap donning/doffing, wear and care, activity modifications and tendon gliding.   Not Met, Pt not performing HEP consistently 7/12/17      Goal:* Patient will demonstrate a good understanding of their condition and strategies for self-management. Status at Eval:  Patient received an initial evaluation today followed by education as to diagnosis, precautions and treatment plan.   Status as last note/recert: patient reports understanding of recovery process but is not 100% compliant with positioning and activity restrictions secondary to work.  [address] be accomplished in 4 weeks:                       Goal:*Patient will regain 220 degrees total arc of motion of the right digits 2-5 to enable grasp of cylindrical objects such as a glass, handle or toothbrush. At Eval:  See chart  Status as last note/recert:  Patient has met for all all digits.         Goal:*Patient will attain 10 degrees or less of right 2-5 digital extension to enable him/ her to reach into pocket. At Eval: see eval  Status as last note/recert: Patient has met goal for all digits.         Goal:* Patient will show a 5 point improvement on FOTO functional status measure to improve overall functional performance. Status at Eval: 69       Goal:* Pt will have 100 pounds of  in the right hand to allow for functional grasp for all ADL activities including dressing, bathing and self care.   At Eval: 41  Status as last note/recert: progressing 86     PLAN  []  Upgrade activities as tolerated     [x]  Continue plan of care  []  Update interventions per flow sheet       []  Discharge due to:_  []  Other:_      Jeferson Jose OT 8/15/2017  3:24 PM    Future Appointments  Date Time Provider Emelia Lima   8/17/2017 2:30 PM Jeferson Jose OT [facility]   8/22/2017 3:00 PM Jeferson Jose OT [facility]

## 2017-08-17 ENCOUNTER — HOSPITAL ENCOUNTER (OUTPATIENT)
Dept: PHYSICAL THERAPY | Age: 57
Discharge: HOME | End: 2017-08-17
Payer: OTHER MISCELLANEOUS

## 2017-08-17 PROCEDURE — 97535 SELF CARE MNGMENT TRAINING: CPT

## 2017-08-17 PROCEDURE — 97018 PARAFFIN BATH THERAPY: CPT

## 2017-08-17 NOTE — PROGRESS NOTES
[facility] [address] [place] [address]  [address]  [place], [facility] [address]  [phone] fax  Patient Name: Anival Krishna  Date:2017  : 1960  [x]  Patient  Verified      Hand Therapy/ Occupational Therapy Discharge Instructions        Continue with home exercise program for 3 weeks then decrease to as needed/patient discretion. Continue to apply paraffin/heat as needed prior to ROM. Follow up with MD: as scheduled      Recommendations: ___x_ Return to activity with home program            ____ Return to activity with the following modifcations                       ____ Post Rehab Program                                  ____Return to/Join local gym    Additional comments:          Please call with any questions or concerns. Thank you for your participation.          Jolly Jang OT 2017  3:04 PM

## 2017-08-17 NOTE — PROGRESS NOTES
OT DISCHARGE DAILY NOTE AND SUMMARY     Date:2017  Patient name: Michael Magana Start of Care: 17   Referral source: Jemma Egan MD : [id number]   Medical/Treatment Diagnosis: Right hand pain [M79.641] Onset Date: 17     Prior Hospitalization: see medical history Provider#: [id number]   Medications: Verified on Patient Summary List    Comorbidities: HTN, sleep apnea   Prior Level of Function: Independent without limitations in ADL and IADL activities. Visits from Start of Care: 7   Missed Visits: 0    Reporting Period : 17-17  [x]  Patient  Verified  Payor: Leonela Santa / Plan: [id number] / Product Type: Workers Comp /    In [employer] Out time:1500  Total Treatment Time (min): 20  Visit #: 7 of 8    Treatment Area: Right hand pain [M79.641]    SUBJECTIVE  Pain Level (0-10 scale): 0/10  Any medication changes, allergies to medications, adverse drug reactions, diagnosis change, or new procedure performed?: [x] No    [] Yes (see summary sheet for update)  Subjective functional status/changes:   [] No changes reported  \"How do I get rid of the stiffness. \"    OBJECTIVE    Modality rationale: increase tissue extensibility to improve the patients ability to make a composite fist   Min Type Additional Details    [] Estim:  []Unatt       []IFC  []Premod                        []Other:  []w/ice   []w/heat  Position:  Location:    [] Estim: []Att    []TENS instruct  []NMES                    []Other:  []w/US   []w/ice   []w/heat  Position:  Location:    []  Traction: [] Cervical       []Lumbar                       [] Prone          []Supine                       []Intermittent   []Continuous Lbs:  [] before manual  [] after manual    []  Ultrasound: []Continuous   [] Pulsed                           []1MHz   []3MHz W/cm2:  Location:    []  Iontophoresis with dexamethasone         Location: [] Take home patch   [] In clinic   10 []  Ice     [x] Heat: Paraffin  []  Ice massage  []  Laser   []  Anodyne Position: Resting  Location: right hand    []  Laser with stim  []  Other:  Position:  Location:    []  Vasopneumatic Device Pressure:       [] lo [] med [] hi   Temperature: [] lo [] med [] hi   [x] Skin assessment post-treatment:  [x]intact [x]redness- no adverse reaction    []redness  adverse reaction:     10 min Self Care/Home Management: paraffin use, HEP review   Rationale: increase ROM and education  to improve the patients ability to return to full use of the right UE    With   [x] TE   [] TA   [] neuro   [] other: Patient Education: [x] Review HEP    [] Progressed/Changed HEP based on:   [] positioning   [] body mechanics   [] transfers   [] heat/ice application   [] Splint wear/care   [] Sensory re-education   [] scar management      [] other:            Other Objective/Functional Measures: Hand ROM -7/7/17     2nd 3rd 4th 5th   MP WNL 5/80 75 70   PIP    25/90 25/90 15/95   DIP    20/60 5/40 55   SWAN    170 170 205        Hand ROM- 7/25/17     2nd 3rd 4th 5th   MP    5/85 80 80   PIP    5/90 5/95 5/95   DIP    0/65 0/50 0/55   SWAN    230 220 225        Hand ROM- 8/3/17     2nd 3rd 4th 5th   MP    5/80 80 75   PIP    5/90 5/90 5/95   DIP    60 45 55   SWAN    220 210 220      Hand ROM- 8/17/17     2nd 3rd 4th 5th   MP    85 80 80   PIP    5/90 5/95 5/95   DIP    75 55 60   SWAN    245 225 230          Strength:   Measurements: Taken with Carrillo Dynamometer, in Lbs   Level 2 7/7/17 7/25/17 8/3/17 8/17/17 Date Date Date   Right 41  60  84  95              Left 107                     Deficit 62%                     Change     +19 +24   +11                   Pinch Measurements: Taken with Pinch Gauge, in Lbs   (hand) 7/7/17 7/25/17 8/3/17 8/17/17 Date Date   Lateral                      Right 15  19 19  21           Left  22                  Deficit                     Change     +4  0 +3           Pad                     Right 12  14 14    16        Left 12                  Deficit                     Change    +2   0  +2           Horacio                     Right 8 15    15   18         Left 14                  Deficit                     Change    +7    0 +3   [de-identified]       Sensation:    No problems reported      Edema: GIRTH CHART measured in cm  Date: 7/7/17    8/3/17 8/17/17   Side right left       DPC circum. 24 23.5 24.5 24.2   2nd- P1 7.5 7       3rd-P1 7.6 7                          Progress towards goals / Updated goals:  Short Term Goals: To be accomplished in 2  weeks:  Goal:* Patient will be compliant with initial home exercise program to take an active role in their rehabilitation process. Status at Eval:   Patient was provided with a basic home exercise program including instruction in jacky strap donning/doffing, wear and care, activity modifications and tendon gliding.   Not Met, Pt not performing HEP consistently 7/12/17      Goal:* Patient will demonstrate a good understanding of their condition and strategies for self-management. Status at Eval:  Patient received an initial evaluation today followed by education as to diagnosis, precautions and treatment plan. Status as last note/recert: patient reports understanding of recovery process but is not 100% compliant with positioning and activity restrictions secondary to work.  [address] be accomplished in 4 weeks:                       Goal:*Patient will regain 220 degrees total arc of motion of the right digits 2-5 to enable grasp of cylindrical objects such as a glass, handle or toothbrush. At Eval:  See chart  Status as last note/recert:  Patient has met for all all digits.      Goal:*Patient will attain 10 degrees or less of right 2-5 digital extension to enable him/ her to reach into pocket.   At Eval: see eval  Status as last note/recert: Patient has met goal for all digits.      Goal:* Patient will show a 5 point improvement on FOTO functional status measure to improve overall functional performance. Status at Eval: 69   Status as last note/recert: Goal met      Goal:* Pt will have 100 pounds of  in the right hand to allow for functional grasp for all ADL activities including dressing, bathing and self care. At Eval: 41  Status as last note/recert: progressing 88     ASSESSMENT/Changes in Function: Patient has returned full functional strength in the right hand, pain is intermittent with overuse but not limiting as it was previously. Patient had met maximum benefit from Hand therapy services at this time.       PLAN:  [x]Discontinue therapy: [x]Patient has reached or is progressing toward set goals      []Patient is non-compliant or has abdicated      []Due to lack of appreciable progress towards set goals    Thank you for this referral!    SONYA Cannon/L, CHT   8/17/2017 2:42 PM